# Patient Record
Sex: MALE | Race: WHITE | NOT HISPANIC OR LATINO | Employment: UNEMPLOYED | URBAN - METROPOLITAN AREA
[De-identification: names, ages, dates, MRNs, and addresses within clinical notes are randomized per-mention and may not be internally consistent; named-entity substitution may affect disease eponyms.]

---
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
## 2025-07-03 ENCOUNTER — OFFICE VISIT (OUTPATIENT)
Dept: FAMILY MEDICINE | Facility: CLINIC | Age: 20
End: 2025-07-03
Payer: COMMERCIAL

## 2025-07-03 VITALS
SYSTOLIC BLOOD PRESSURE: 138 MMHG | OXYGEN SATURATION: 97 % | DIASTOLIC BLOOD PRESSURE: 86 MMHG | WEIGHT: 315 LBS | HEART RATE: 83 BPM | TEMPERATURE: 98.8 F | RESPIRATION RATE: 12 BRPM | HEIGHT: 75 IN | BODY MASS INDEX: 39.17 KG/M2

## 2025-07-03 DIAGNOSIS — F41.1 GAD (GENERALIZED ANXIETY DISORDER): ICD-10-CM

## 2025-07-03 DIAGNOSIS — H61.22 IMPACTED CERUMEN OF LEFT EAR: ICD-10-CM

## 2025-07-03 DIAGNOSIS — H60.92 OTITIS EXTERNA OF LEFT EAR, UNSPECIFIED CHRONICITY, UNSPECIFIED TYPE: ICD-10-CM

## 2025-07-03 DIAGNOSIS — F33.0 MILD RECURRENT MAJOR DEPRESSION: Primary | ICD-10-CM

## 2025-07-03 PROCEDURE — 99215 OFFICE O/P EST HI 40 MIN: CPT | Performed by: NURSE PRACTITIONER

## 2025-07-03 PROCEDURE — 3075F SYST BP GE 130 - 139MM HG: CPT | Performed by: NURSE PRACTITIONER

## 2025-07-03 PROCEDURE — 3079F DIAST BP 80-89 MM HG: CPT | Performed by: NURSE PRACTITIONER

## 2025-07-03 RX ORDER — NEOMYCIN SULFATE, POLYMYXIN B SULFATE AND HYDROCORTISONE 3.5; 10000; 1 MG/ML; [IU]/ML; MG/ML
3 SOLUTION AURICULAR (OTIC) 3 TIMES DAILY
Qty: 10 ML | Refills: 0 | Status: SHIPPED | OUTPATIENT
Start: 2025-07-03

## 2025-07-03 RX ORDER — ACETAMINOPHEN 160 MG
1 TABLET,DISINTEGRATING ORAL SEE ADMIN INSTRUCTIONS
OUTPATIENT
Start: 2025-07-10

## 2025-07-03 RX ADMIN — Medication 1 ML: at 15:23

## 2025-07-03 ASSESSMENT — ANXIETY QUESTIONNAIRES
GAD7 TOTAL SCORE: 5
IF YOU CHECKED OFF ANY PROBLEMS ON THIS QUESTIONNAIRE, HOW DIFFICULT HAVE THESE PROBLEMS MADE IT FOR YOU TO DO YOUR WORK, TAKE CARE OF THINGS AT HOME, OR GET ALONG WITH OTHER PEOPLE: SOMEWHAT DIFFICULT
5. BEING SO RESTLESS THAT IT IS HARD TO SIT STILL: SEVERAL DAYS
GAD7 TOTAL SCORE: 5
1. FEELING NERVOUS, ANXIOUS, OR ON EDGE: NOT AT ALL
6. BECOMING EASILY ANNOYED OR IRRITABLE: MORE THAN HALF THE DAYS
2. NOT BEING ABLE TO STOP OR CONTROL WORRYING: NOT AT ALL
3. WORRYING TOO MUCH ABOUT DIFFERENT THINGS: NOT AT ALL
GAD7 TOTAL SCORE: 5
7. FEELING AFRAID AS IF SOMETHING AWFUL MIGHT HAPPEN: SEVERAL DAYS
8. IF YOU CHECKED OFF ANY PROBLEMS, HOW DIFFICULT HAVE THESE MADE IT FOR YOU TO DO YOUR WORK, TAKE CARE OF THINGS AT HOME, OR GET ALONG WITH OTHER PEOPLE?: SOMEWHAT DIFFICULT
7. FEELING AFRAID AS IF SOMETHING AWFUL MIGHT HAPPEN: SEVERAL DAYS
4. TROUBLE RELAXING: SEVERAL DAYS

## 2025-07-03 ASSESSMENT — PATIENT HEALTH QUESTIONNAIRE - PHQ9
SUM OF ALL RESPONSES TO PHQ QUESTIONS 1-9: 14
SUM OF ALL RESPONSES TO PHQ QUESTIONS 1-9: 14
10. IF YOU CHECKED OFF ANY PROBLEMS, HOW DIFFICULT HAVE THESE PROBLEMS MADE IT FOR YOU TO DO YOUR WORK, TAKE CARE OF THINGS AT HOME, OR GET ALONG WITH OTHER PEOPLE: SOMEWHAT DIFFICULT

## 2025-07-03 NOTE — PROGRESS NOTES
Assessment & Plan     This is a 20-year-old male presenting with left ear blockage due to impacted cerumen with secondary otitis externa, as well as recurrent depression (PHQ-9: 14) and anxiety following discontinuation of sertraline in August 2024.      Mild recurrent major depression  PLAN:   - REVIEW OF HEALTH MAINTENANCE PROTOCOL ORDERS  - PRIMARY CARE FOLLOW-UP SCHEDULING  - Restarting sertraline (Zoloft) 50 mg daily with 30-day supply and 1 refill.  - Discussed benefits of resuming medication given worsening symptoms since discontinuation.  -Referred to Adult [facility] for additional support.  Scheduled follow-up in 4 weeks to assess response to medication.  Reviewed warning signs requiring immediate attention and crisis resources.    GERARD (generalized anxiety disorder)  PLAN:   - REVIEW OF HEALTH MAINTENANCE PROTOCOL ORDERS  - sertraline (ZOLOFT) 50 MG tablet  Dispense: 30 tablet; Refill: 1  - PRIMARY CARE FOLLOW-UP SCHEDULING  - [facility]    Impacted cerumen of left ear  PLAN:   - VT REMOVAL IMPACTED CERUMEN IRRIGATION/LVG UNILAT (RN/MA)  - hydrogen peroxide 3 % solution 1 mL  Performed by: HUNTER Mcdaniel  Authorized by: MADHAVI Garber  Consent: Verbal consent obtained.  Risks and benefits: risks, benefits and alternatives were discussed  Required items: required blood products, implants, devices, and special equipment available  Body area: ear  Localization method: visualized  Removal mechanism: ear scoop + lavage  Objects recovered: cerumen   Post-procedure assessment: copious discharge removed  Patient tolerance: Patient tolerated the procedure well with no immediate complications     Otitis externa of left ear, unspecified chronicity, unspecified type  Comment:Right ear with external canal edema and purulence. + tragal tenderness. No mastoid tenderness after removal with cerumen. TM not visualized due to cerumen. Right ear normal with TM intact.  PLAN:   -  "neomycin-polymyxin-hydrocortisone (CORTISPORIN) 3.5-18998-8 otic solution  Dispense: 10 mL; Refill: 0  -Avoid wetness exposure in the ear canal.    -Refrain from using Q-Tips or other foreign objects in the ear canal.    -If symptoms are worsening, return to regular provider.        41 minutes spent by me on the date of the encounter doing chart review, patient visit, and documentation      BMI  Estimated body mass index is 39.5 kg/m  as calculated from the following:    Height as of this encounter: 1.905 m (6' 3\").    Weight as of this encounter: 143.3 kg (316 lb).   Weight management plan: Discussed healthy diet and exercise guidelines          Jovanna Cancino is a 20 year old, presenting for the following health issues:  Mental Health Problem and Ear Problem (Left ear plugged, slight pain; x 5 days ago )        7/3/2025     2:40 PM   Additional Questions   Roomed by Violeat FRITZ CMA         7/3/2025     2:40 PM   Patient Reported Additional Medications   Patient reports taking the following new medications None     Mental Health Problem    History of Present Illness       He eats 2-3 servings of fruits and vegetables daily.He consumes 1 sweetened beverage(s) daily.He exercises with enough effort to increase his heart rate 20 to 29 minutes per day.  He exercises with enough effort to increase his heart rate 4 days per week. He is missing 7 dose(s) of medications per week.  He is not taking prescribed medications regularly due to other.      History of Present Illness-  Barak Sprague, 20 years old male, presented with a blocked left ear that began a couple of days prior to the appointment. He attempted to flush the ear multiple times using a flush kit with drops and Q-tips, but these efforts were unsuccessful. He expressed concern that the ear might be swollen and worried about potential damage. No itching was reported in the ear.  No swimming activity was reported.  Depression and Anxiety   How are you " doing with your depression since your last visit? Worsened   How are you doing with your anxiety since your last visit?  Worsened   Are you having other symptoms that might be associated with depression or anxiety? No  Have you had a significant life event? Relationship Concerns   Do you have any concerns with your use of alcohol or other drugs? No  Previously, he was on sertraline (Zoloft) for depression and anxiety, which was discontinued in late August 2024 when starting his freshman year of college. Initially, he felt well after stopping the medication, but symptoms began to worsen over the last school year, peaking near the end. His depression score (PHQ-9) is 14, indicating moderate depression, while his anxiety score is 5. No suicidal ideation was reported. He experienced a breakup about a month ago, but issues with relationships were present before that. He has no concerns about drugs or alcohol.  Social History     Tobacco Use    Smoking status: Never     Passive exposure: Never    Smokeless tobacco: Never    Tobacco comments:     non smoking home   Vaping Use    Vaping status: Never Used   Substance Use Topics    Alcohol use: Yes    Drug use: Yes     Types: Marijuana         9/23/2022     7:06 AM 7/10/2023     3:02 PM 7/3/2025    12:11 PM   PHQ   PHQ-9 Total Score  6 14    Q9: Thoughts of better off dead/self-harm past 2 weeks  Not at all  Not at all   PHQ-A Total Score 7     PHQ-A Depressed most days in past year No     PHQ-A Mood affect on daily activities Somewhat difficult     PHQ-A Suicide Ideation past 2 weeks Not at all     PHQ-A Suicide Ideation past month No     PHQ-A Previous suicide attempt No         Patient-reported    Proxy-reported         11/19/2019     6:28 PM 7/3/2025    12:11 PM 7/3/2025    12:12 PM   GERARD-7 SCORE   Total Score   5 (mild anxiety)   Total Score 1 5         Patient-reported         Suicide Assessment Five-step Evaluation and Treatment (SAFE-T)            Review of  "Systems  Constitutional, HEENT, cardiovascular, pulmonary, GI, , musculoskeletal, neuro, skin, endocrine and psych systems are negative, except as otherwise noted.      Objective    BP (!) 150/88   Pulse 83   Temp 98.8  F (37.1  C) (Temporal)   Resp 12   Ht 1.905 m (6' 3\")   Wt (!) 143.3 kg (316 lb)   SpO2 97%   BMI 39.50 kg/m    Body mass index is 39.5 kg/m .  Physical Exam   GENERAL: alert and no distress  EYES: Eyes grossly normal to inspection, PERRL and conjunctivae and sclerae normal  HENT: left  ear with external canal edema and purulence. + tragal tenderness. No mastoid tenderness.   NECK: no adenopathy, no asymmetry, masses, or scars  RESP: lungs clear to auscultation - no rales, rhonchi or wheezes  CV: regular rate and rhythm, normal S1 S2, no S3 or S4, no murmur, click or rub, no peripheral edema  ABDOMEN: soft, nontender, no hepatosplenomegaly, no masses and bowel sounds normal  MS: no gross musculoskeletal defects noted, no edema  SKIN: no suspicious lesions or rashes  NEURO: Normal strength and tone, mentation intact and speech normal  PSYCH: mentation appears normal, affect normal/bright            Signed Electronically by: PAVAN Manley CNP      I am utilizing AI dictation through [employer] to document the patient's history and physical examination. Please note that while the AI is designed to assist in capturing detailed information, there may be errors in the dictation. I will review and edit the content for accuracy before finalizing.     "

## 2025-07-03 NOTE — LETTER
My Depression Action Plan  Name: Barak Sprague   Date of Birth 2005  Date: 7/3/2025    My doctor: No Ref-Primary, Physician   My clinic: [facility]  [address]  [place] [address]  [phone]            GREEN    ZONE   Good Control    What it looks like:   Things are going generally well. You have normal ups and downs. You may even feel depressed from time to time, but bad moods usually last less than a day.   What you need to do:  Continue to care for yourself (see self care plan)  Check your depression survival kit and update it as needed  Follow your physician s recommendations including any medication.  Do not stop taking medication unless you consult with your physician first.             YELLOW         ZONE Getting Worse    What it looks like:   Depression is starting to interfere with your life.   It may be hard to get out of bed; you may be starting to isolate yourself from others.  Symptoms of depression are starting to last most all day and this has happened for several days.   You may have suicidal thoughts but they are not constant.   What you need to do:     Call your care team. Your response to treatment will improve if you keep your care team informed of your progress. Yellow periods are signs an adjustment may need to be made.     Continue your self-care.  Just get dressed and ready for the day.  Don't give yourself time to talk yourself out of it.    Talk to someone in your support network.    Open up your Depression Self-Care Plan/Wellness Kit.             RED    ZONE Medical Alert - Get Help    What it looks like:   Depression is seriously interfering with your life.   You may experience these or other symptoms: You can t get out of bed most days, can t work or engage in other necessary activities, you have trouble taking care of basic hygiene, or basic responsibilities, thoughts of suicide or death that will not go away,  self-injurious behavior.     What you need to do:  Call your care team and request a same-day appointment. If they are not available (weekends or after hours) call your local crisis line, emergency room or 911.          Depression Self-Care Plan / Wellness Kit    Many people find that medication and therapy are helpful treatments for managing depression. In addition, making small changes to your everyday life can help to boost your mood and improve your wellbeing. Below are some tips for you to consider. Be sure to talk with your medical provider and/or behavioral health consultant if your symptoms are worsening or not improving.     Sleep   Sleep hygiene  means all of the habits that support good, restful sleep. It includes maintaining a consistent bedtime and wake time, using your bedroom only for sleeping or sex, and keeping the bedroom dark and free of distractions like a computer, smartphone, or television.     Develop a Healthy Routine  Maintain good hygiene. Get out of bed in the morning, make your bed, brush your teeth, take a shower, and get dressed. Don t spend too much time viewing media that makes you feel stressed. Find time to relax each day.    Exercise  Get some form of exercise every day. This will help reduce pain and release endorphins, the  feel good  chemicals in your brain. It can be as simple as just going for a walk or doing some gardening, anything that will get you moving.      Diet  Strive to eat healthy foods, including fruits and vegetables. Drink plenty of water. Avoid excessive sugar, caffeine, alcohol, and other mood-altering substances.     Stay Connected with Others  Stay in touch with friends and family members.    Manage Your Mood  Try deep breathing, massage therapy, biofeedback, or meditation. Take part in fun activities when you can. Try to find something to smile about each day.     Psychotherapy  Be open to working with a therapist if your provider recommends it.      Medication  Be sure to take your medication as prescribed. Most anti-depressants need to be taken every day. It usually takes several weeks for medications to work. Not all medicines work for all people. It is important to follow-up with your provider to make sure you have a treatment plan that is working for you. Do not stop your medication abruptly without first discussing it with your provider.    Crisis Resources   These hotlines are for both adults and children. They and are open 24 hours a day, 7 days a week unless noted otherwise.    National Suicide Prevention Lifeline   988 or 6-092-759-IOLB (3110)    Crisis Text Line    www.crisistextline.org  Text HOME to 196591 from anywhere in the [place], anytime, about any type of crisis. A live, trained crisis counselor will receive the text and respond quickly.    [place] Lifeline for LGBTQ Youth  A national crisis intervention and suicide lifeline for LGBTQ youth under 25. Provides a safe place to talk without judgement. Call 1-290.294.4234; text START to 609446 or visit www.thetrevorproject.org to talk to a trained counselor.    For [facility] crisis numbers, visit the [facility] website at:  https://[place].gov/dhs/people-we-serve/adults/health-care/mental-health/resources/crisis-contacts.jsp

## 2025-07-07 ENCOUNTER — PATIENT OUTREACH (OUTPATIENT)
Dept: CARE COORDINATION | Facility: CLINIC | Age: 20
End: 2025-07-07
Payer: COMMERCIAL

## 2025-09-01 ENCOUNTER — TELEPHONE (OUTPATIENT)
Dept: FAMILY MEDICINE | Facility: CLINIC | Age: 20
End: 2025-09-01
Payer: COMMERCIAL

## 2025-09-01 DIAGNOSIS — F33.0 MILD RECURRENT MAJOR DEPRESSION: ICD-10-CM

## 2025-09-01 DIAGNOSIS — F41.1 GAD (GENERALIZED ANXIETY DISORDER): ICD-10-CM
